# Patient Record
Sex: MALE | Race: WHITE
[De-identification: names, ages, dates, MRNs, and addresses within clinical notes are randomized per-mention and may not be internally consistent; named-entity substitution may affect disease eponyms.]

---

## 2022-06-21 PROBLEM — Z00.00 ENCOUNTER FOR PREVENTIVE HEALTH EXAMINATION: Status: ACTIVE | Noted: 2022-06-21

## 2022-07-08 ENCOUNTER — APPOINTMENT (OUTPATIENT)
Dept: ORTHOPEDIC SURGERY | Facility: CLINIC | Age: 82
End: 2022-07-08

## 2022-07-08 VITALS — BODY MASS INDEX: 22.28 KG/M2 | HEIGHT: 68 IN | WEIGHT: 147 LBS

## 2022-07-08 DIAGNOSIS — Z78.9 OTHER SPECIFIED HEALTH STATUS: ICD-10-CM

## 2022-07-08 DIAGNOSIS — E78.00 PURE HYPERCHOLESTEROLEMIA, UNSPECIFIED: ICD-10-CM

## 2022-07-08 PROCEDURE — 20550 NJX 1 TENDON SHEATH/LIGAMENT: CPT | Mod: F2

## 2022-07-08 PROCEDURE — 99214 OFFICE O/P EST MOD 30 MIN: CPT | Mod: 25

## 2022-07-08 RX ORDER — SIMVASTATIN 10 MG/1
10 TABLET, FILM COATED ORAL
Qty: 90 | Refills: 0 | Status: ACTIVE | COMMUNITY
Start: 2022-03-29

## 2022-07-08 NOTE — ASSESSMENT
[FreeTextEntry1] : Left middle finger trigger - Discussed with patient the pathoanatomy of trigger and options going forward. Risks/benefits discussed as well as natural progression that injection will provide some relief but symptoms may recur. Discussed that pain may worsen in coming days but will subside. Discussed that we can repeat an injection or that operative intervention may be indicated down the line.\par \par Procedure 1 - 0.5cc 1%lidocaine + 0.5cc 40mg/cc Kenalog administered to left middle finger A1 pulley following sterilization with Betadine. Patient tolerated this well.\par \par F/u 3 months [email or web address]

## 2022-07-08 NOTE — HISTORY OF PRESENT ILLNESS
[de-identified] : 82M, RHD, PMHX of Hyperlipidemia presents with lefte hand middle finger locking, clicking, sticking for approx 1 month. Patient reports does have pain. Denies limited ROM. Denies OT, bracing, OTC remedies.

## 2022-11-28 ENCOUNTER — OFFICE (OUTPATIENT)
Dept: URBAN - METROPOLITAN AREA CLINIC 8 | Facility: CLINIC | Age: 82
Setting detail: OPHTHALMOLOGY
End: 2022-11-28
Payer: MEDICARE

## 2022-11-28 DIAGNOSIS — H43.813: ICD-10-CM

## 2022-11-28 DIAGNOSIS — H02.831: ICD-10-CM

## 2022-11-28 DIAGNOSIS — H35.3131: ICD-10-CM

## 2022-11-28 DIAGNOSIS — Z96.1: ICD-10-CM

## 2022-11-28 DIAGNOSIS — H02.834: ICD-10-CM

## 2022-11-28 DIAGNOSIS — H26.493: ICD-10-CM

## 2022-11-28 PROCEDURE — 92134 CPTRZ OPH DX IMG PST SGM RTA: CPT | Performed by: OPHTHALMOLOGY

## 2022-11-28 PROCEDURE — 92014 COMPRE OPH EXAM EST PT 1/>: CPT | Performed by: OPHTHALMOLOGY

## 2022-11-28 ASSESSMENT — REFRACTION_CURRENTRX
OD_AXIS: 100
OS_OVR_VA: 20/
OD_SPHERE: +3.25
OS_ADD: +2.25
OD_OVR_VA: 20/
OD_VPRISM_DIRECTION: SV
OD_VPRISM_DIRECTION: SV
OS_AXIS: 090
OS_VPRISM_DIRECTION: SV
OD_CYLINDER: -1.50
OS_VPRISM_DIRECTION: SV
OS_SPHERE: +3.25
OD_ADD: +2.25
OS_OVR_VA: 20/
OD_OVR_VA: 20/
OS_CYLINDER: -1.00

## 2022-11-28 ASSESSMENT — REFRACTION_AUTOREFRACTION
OS_CYLINDER: -2.25
OD_SPHERE: -0.25
OD_AXIS: 108
OS_SPHERE: +0.25
OS_AXIS: 071
OD_CYLINDER: -0.50

## 2022-11-28 ASSESSMENT — REFRACTION_MANIFEST
OD_AXIS: 100
OD_SPHERE: +0.75
OD_ADD: +2.50
OS_CYLINDER: -0.75
OD_CYLINDER: -1.50
OD_AXIS: 110
OS_SPHERE: +0.75
OS_AXIS: 090
OS_ADD: +2.50
OS_ADD: +2.50
OD_CYLINDER: -0.50
OS_SPHERE: PLANO
OS_CYLINDER: -1.00
OD_VA1: 20/30-
OS_AXIS: 075
OS_VA2: 20/25(J1)
OS_VA1: 20/25-
OD_SPHERE: -0.50
OD_ADD: +2.50
OD_VA2: 20/25(J1)
OU_VA: 20/20-

## 2022-11-28 ASSESSMENT — SPHEQUIV_DERIVED
OS_SPHEQUIV: 0.25
OD_SPHEQUIV: -0.75
OD_SPHEQUIV: -0.5
OD_SPHEQUIV: 0
OS_SPHEQUIV: -0.875

## 2022-11-28 ASSESSMENT — KERATOMETRY
OS_K1POWER_DIOPTERS: 43.75
OD_AXISANGLE_DEGREES: 090
OS_AXISANGLE_DEGREES: 165
OD_K2POWER_DIOPTERS: 44.00
OD_K1POWER_DIOPTERS: 44.00
OS_K2POWER_DIOPTERS: 45.00
METHOD_AUTO_MANUAL: AUTO

## 2022-11-28 ASSESSMENT — VISUAL ACUITY
OD_BCVA: 20/40-2
OS_BCVA: 20/40-2

## 2022-11-28 ASSESSMENT — TONOMETRY
OS_IOP_MMHG: 12
OD_IOP_MMHG: 12

## 2022-11-28 ASSESSMENT — AXIALLENGTH_DERIVED
OD_AL: 23.701
OD_AL: 23.6031
OS_AL: 23.6114
OS_AL: 23.1804
OD_AL: 23.4097

## 2022-11-28 ASSESSMENT — LID POSITION - DERMATOCHALASIS
OS_DERMATOCHALASIS: T
OD_DERMATOCHALASIS: T

## 2022-11-28 ASSESSMENT — CONFRONTATIONAL VISUAL FIELD TEST (CVF)
OD_FINDINGS: FULL
OS_FINDINGS: FULL

## 2023-01-05 PROBLEM — H40.001 GLAUCOMA SUSPECT, LOW RISK 1-2 FACTORS; RIGHT EYE, LEFT EYE: Status: ACTIVE | Noted: 2022-12-14

## 2023-01-05 PROBLEM — H40.002 GLAUCOMA SUSPECT, LOW RISK 1-2 FACTORS; RIGHT EYE, LEFT EYE: Status: ACTIVE | Noted: 2022-12-14

## 2023-05-22 ENCOUNTER — RX ONLY (RX ONLY)
Age: 83
End: 2023-05-22

## 2023-05-22 ENCOUNTER — OFFICE (OUTPATIENT)
Dept: URBAN - METROPOLITAN AREA CLINIC 8 | Facility: CLINIC | Age: 83
Setting detail: OPHTHALMOLOGY
End: 2023-05-22
Payer: MEDICARE

## 2023-05-22 DIAGNOSIS — H02.834: ICD-10-CM

## 2023-05-22 DIAGNOSIS — H26.493: ICD-10-CM

## 2023-05-22 DIAGNOSIS — H02.831: ICD-10-CM

## 2023-05-22 DIAGNOSIS — H43.813: ICD-10-CM

## 2023-05-22 DIAGNOSIS — H35.3131: ICD-10-CM

## 2023-05-22 DIAGNOSIS — Z96.1: ICD-10-CM

## 2023-05-22 PROCEDURE — 92134 CPTRZ OPH DX IMG PST SGM RTA: CPT | Performed by: OPHTHALMOLOGY

## 2023-05-22 PROCEDURE — 99213 OFFICE O/P EST LOW 20 MIN: CPT | Performed by: OPHTHALMOLOGY

## 2023-05-22 ASSESSMENT — REFRACTION_CURRENTRX
OS_VPRISM_DIRECTION: SV
OS_OVR_VA: 20/
OD_AXIS: 100
OS_VPRISM_DIRECTION: SV
OD_SPHERE: +3.25
OD_VPRISM_DIRECTION: SV
OS_OVR_VA: 20/
OS_ADD: +2.25
OS_AXIS: 090
OD_OVR_VA: 20/
OD_ADD: +2.25
OD_OVR_VA: 20/
OD_VPRISM_DIRECTION: SV
OS_CYLINDER: -1.00
OD_CYLINDER: -1.50
OS_SPHERE: +3.25

## 2023-05-22 ASSESSMENT — LID POSITION - DERMATOCHALASIS
OS_DERMATOCHALASIS: T
OD_DERMATOCHALASIS: T

## 2023-05-22 ASSESSMENT — VISUAL ACUITY
OD_BCVA: 20/60-
OS_BCVA: 20/60

## 2023-05-22 ASSESSMENT — REFRACTION_AUTOREFRACTION
OS_CYLINDER: -2.25
OD_AXIS: 108
OD_SPHERE: PLANO
OS_SPHERE: +0.25
OS_AXIS: 078
OD_CYLINDER: -1.00

## 2023-05-22 ASSESSMENT — KERATOMETRY
OS_AXISANGLE_DEGREES: 013
OD_AXISANGLE_DEGREES: 177
METHOD_AUTO_MANUAL: AUTO
OS_K1POWER_DIOPTERS: 43.25
OS_K2POWER_DIOPTERS: 45.50
OD_K1POWER_DIOPTERS: 43.75
OD_K2POWER_DIOPTERS: 44.00

## 2023-05-22 ASSESSMENT — REFRACTION_MANIFEST
OD_AXIS: 110
OD_SPHERE: -1.00
OS_CYLINDER: -0.75
OS_AXIS: 080
OD_AXIS: 110
OD_CYLINDER: -0.25
OU_VA: 20/20
OD_ADD: +2.75
OD_ADD: +3.00
OS_VA2: 20/20(J1+)
OS_CYLINDER: -0.75
OD_VA2: 20/20(J1+)
OS_VA1: 20/25+3
OS_ADD: +2.75
OS_AXIS: 080
OS_ADD: +3.00
OS_SPHERE: PLANO
OS_VA1: 20/25+3
OS_VA2: 20/20(J1+)
OS_SPHERE: -0.50
OD_VA1: 20/40
OD_VA2: 20/20(J1+)
OU_VA: 20/20
OD_SPHERE: -0.75
OD_VA1: 20/40
OD_CYLINDER: -0.25

## 2023-05-22 ASSESSMENT — SPHEQUIV_DERIVED
OS_SPHEQUIV: -0.875
OD_SPHEQUIV: -0.875
OD_SPHEQUIV: -1.125
OS_SPHEQUIV: -0.875

## 2023-05-22 ASSESSMENT — AXIALLENGTH_DERIVED
OD_AL: 23.7968
OD_AL: 23.8963
OS_AL: 23.6114
OS_AL: 23.6114

## 2023-05-22 ASSESSMENT — CONFRONTATIONAL VISUAL FIELD TEST (CVF)
OD_FINDINGS: FULL
OS_FINDINGS: FULL

## 2023-06-30 ENCOUNTER — APPOINTMENT (OUTPATIENT)
Dept: ORTHOPEDIC SURGERY | Facility: CLINIC | Age: 83
End: 2023-06-30
Payer: MEDICARE

## 2023-06-30 VITALS — HEIGHT: 68 IN | WEIGHT: 147 LBS | BODY MASS INDEX: 22.28 KG/M2

## 2023-06-30 DIAGNOSIS — M65.30 TRIGGER FINGER, UNSPECIFIED FINGER: ICD-10-CM

## 2023-06-30 PROCEDURE — 99214 OFFICE O/P EST MOD 30 MIN: CPT | Mod: 25

## 2023-06-30 PROCEDURE — 20550 NJX 1 TENDON SHEATH/LIGAMENT: CPT | Mod: LT

## 2023-06-30 RX ORDER — ASPIRIN 81 MG/1
81 TABLET ORAL
Refills: 0 | Status: ACTIVE | COMMUNITY

## 2023-06-30 NOTE — ASSESSMENT
[FreeTextEntry1] : Left middle finger trigger - Discussed with patient the pathoanatomy of trigger and options going forward. Risks/benefits discussed as well as natural progression that injection will provide some relief but symptoms may recur. Discussed that pain may worsen in coming days but will subside. Discussed that we can repeat an injection or that operative intervention may be indicated down the line.\par \par Procedure 1 - 0.5cc 1%lidocaine + 0.5cc 40mg/cc Kenalog administered to left middle finger A1 pulley following sterilization with Betadine. Patient tolerated this well.\par \par F/u 3 months ([email or web address])

## 2023-06-30 NOTE — IMAGING
[de-identified] : Left hand with no swelling or erythema. Able to make fist, oppose thumb to small finger with good thenar bulk. No intrinsic atrophy. Tender along distal palmar crease, most notable at middle finger. Overt locking with catching palpable. Sensation intact throughout with <2sec cap refill.

## 2023-06-30 NOTE — HISTORY OF PRESENT ILLNESS
[de-identified] : 82M, RHD, PMHX of Hyperlipidemia presents with lefte hand middle finger locking, clicking, sticking for approx 1 month. Patient reports does have pain. Denies limited ROM. Denies OT, bracing, OTC remedies.\par \par 6/30/23: follow up left middle finger. States he felt relief from his trigger finger injection up until ~2 months ago. States his finger is locking up. States he feels pain with exercise and sleep. Denies taking pain medication.

## 2023-11-20 ENCOUNTER — OFFICE (OUTPATIENT)
Dept: URBAN - METROPOLITAN AREA CLINIC 8 | Facility: CLINIC | Age: 83
Setting detail: OPHTHALMOLOGY
End: 2023-11-20
Payer: MEDICARE

## 2023-11-20 DIAGNOSIS — H02.834: ICD-10-CM

## 2023-11-20 DIAGNOSIS — H26.493: ICD-10-CM

## 2023-11-20 DIAGNOSIS — H43.813: ICD-10-CM

## 2023-11-20 DIAGNOSIS — H02.831: ICD-10-CM

## 2023-11-20 DIAGNOSIS — H35.3131: ICD-10-CM

## 2023-11-20 DIAGNOSIS — Z96.1: ICD-10-CM

## 2023-11-20 PROCEDURE — 92014 COMPRE OPH EXAM EST PT 1/>: CPT | Performed by: OPHTHALMOLOGY

## 2023-11-20 PROCEDURE — 92134 CPTRZ OPH DX IMG PST SGM RTA: CPT | Performed by: OPHTHALMOLOGY

## 2023-11-20 ASSESSMENT — REFRACTION_CURRENTRX
OD_OVR_VA: 20/
OD_ADD: +2.25
OD_VPRISM_DIRECTION: SV
OS_ADD: +2.25
OS_SPHERE: +3.25
OS_OVR_VA: 20/
OD_SPHERE: +3.25
OS_AXIS: 090
OD_OVR_VA: 20/
OD_CYLINDER: -1.50
OS_OVR_VA: 20/
OS_VPRISM_DIRECTION: SV
OD_AXIS: 100
OD_VPRISM_DIRECTION: SV
OS_VPRISM_DIRECTION: SV
OS_CYLINDER: -1.00

## 2023-11-20 ASSESSMENT — LID POSITION - DERMATOCHALASIS
OD_DERMATOCHALASIS: T
OS_DERMATOCHALASIS: T

## 2023-11-20 ASSESSMENT — REFRACTION_MANIFEST
OD_VA1: 20/40
OS_VA1: 20/25+3
OS_SPHERE: -0.50
OD_ADD: +3.00
OS_VA2: 20/20(J1+)
OD_VA1: 20/40
OS_AXIS: 080
OD_CYLINDER: -0.25
OU_VA: 20/25
OD_SPHERE: -1.00
OU_VA: 20/20
OD_CYLINDER: -0.25
OD_VA2: 20/20(J1+)
OD_ADD: +2.75
OD_AXIS: 110
OS_SPHERE: -0.50
OD_VA2: 20/20(J1+)
OS_ADD: +2.75
OS_VA2: 20/20(J1+)
OS_VA1: 20/30-
OS_CYLINDER: -1.25
OS_AXIS: 080
OD_SPHERE: -0.50
OD_AXIS: 090
OS_ADD: +3.00
OS_CYLINDER: -0.75

## 2023-11-20 ASSESSMENT — SPHEQUIV_DERIVED
OS_SPHEQUIV: -0.875
OS_SPHEQUIV: -0.625
OS_SPHEQUIV: -1.125
OD_SPHEQUIV: -1.125
OD_SPHEQUIV: -0.75
OD_SPHEQUIV: -0.625

## 2023-11-20 ASSESSMENT — CONFRONTATIONAL VISUAL FIELD TEST (CVF)
OS_FINDINGS: FULL
OD_FINDINGS: FULL

## 2023-11-20 ASSESSMENT — REFRACTION_AUTOREFRACTION
OD_SPHERE: -0.25
OD_AXIS: 091
OD_CYLINDER: -1.00
OS_CYLINDER: -1.75
OS_AXIS: 081
OS_SPHERE: +0.25

## 2024-01-08 PROBLEM — H40.002 GLAUCOMA SUSPECT, LOW RISK 1-2 FACTORS; RIGHT EYE, LEFT EYE: Status: ACTIVE | Noted: 2024-01-08

## 2024-01-08 PROBLEM — H40.001 GLAUCOMA SUSPECT, LOW RISK 1-2 FACTORS; RIGHT EYE, LEFT EYE: Status: ACTIVE | Noted: 2024-01-08

## 2024-05-22 ENCOUNTER — OFFICE (OUTPATIENT)
Dept: URBAN - METROPOLITAN AREA CLINIC 8 | Facility: CLINIC | Age: 84
Setting detail: OPHTHALMOLOGY
End: 2024-05-22
Payer: MEDICARE

## 2024-05-22 VITALS — HEIGHT: 60 IN

## 2024-05-22 DIAGNOSIS — H02.831: ICD-10-CM

## 2024-05-22 DIAGNOSIS — H35.3131: ICD-10-CM

## 2024-05-22 DIAGNOSIS — H26.493: ICD-10-CM

## 2024-05-22 DIAGNOSIS — H02.834: ICD-10-CM

## 2024-05-22 DIAGNOSIS — Z96.1: ICD-10-CM

## 2024-05-22 PROBLEM — H43.813 POSTERIOR VITREOUS DETACHMENT ; BOTH EYES: Status: ACTIVE | Noted: 2024-05-22

## 2024-05-22 PROCEDURE — 99213 OFFICE O/P EST LOW 20 MIN: CPT | Performed by: OPHTHALMOLOGY

## 2024-05-22 PROCEDURE — 92134 CPTRZ OPH DX IMG PST SGM RTA: CPT | Performed by: OPHTHALMOLOGY

## 2024-05-22 ASSESSMENT — LID POSITION - DERMATOCHALASIS
OD_DERMATOCHALASIS: T
OS_DERMATOCHALASIS: T

## 2024-05-22 ASSESSMENT — CONFRONTATIONAL VISUAL FIELD TEST (CVF)
OS_FINDINGS: FULL
OD_FINDINGS: FULL

## 2024-09-25 PROBLEM — H40.002 GLAUCOMA SUSPECT, LOW RISK 1-2 FACTORS; RIGHT EYE, LEFT EYE: Status: ACTIVE | Noted: 2024-09-25

## 2024-09-25 PROBLEM — H40.001 GLAUCOMA SUSPECT, LOW RISK 1-2 FACTORS; RIGHT EYE, LEFT EYE: Status: ACTIVE | Noted: 2024-09-25

## 2025-01-07 ENCOUNTER — OFFICE (OUTPATIENT)
Dept: URBAN - METROPOLITAN AREA CLINIC 8 | Facility: CLINIC | Age: 85
Setting detail: OPHTHALMOLOGY
End: 2025-01-07
Payer: MEDICARE

## 2025-01-07 VITALS — HEIGHT: 60 IN

## 2025-01-07 DIAGNOSIS — H43.813: ICD-10-CM

## 2025-01-07 DIAGNOSIS — H26.493: ICD-10-CM

## 2025-01-07 DIAGNOSIS — H35.3131: ICD-10-CM

## 2025-01-07 DIAGNOSIS — H02.831: ICD-10-CM

## 2025-01-07 PROCEDURE — 92134 CPTRZ OPH DX IMG PST SGM RTA: CPT

## 2025-01-07 PROCEDURE — 92014 COMPRE OPH EXAM EST PT 1/>: CPT

## 2025-01-07 ASSESSMENT — REFRACTION_MANIFEST
OS_ADD: +2.50
OS_SPHERE: PLANO
OD_VA2: 20/20(J1+)
OD_ADD: +2.75
OD_VA2: 20/20(J1+)
OS_CYLINDER: -0.75
OS_SPHERE: -0.50
OS_VA1: 20/25+3
OS_VA2: 20/20(J1+)
OD_SPHERE: -0.25
OD_CYLINDER: -0.25
OD_VA1: 20/40
OD_CYLINDER: -0.75
OU_VA: 20/20
OS_AXIS: 075
OS_AXIS: 080
OS_VA1: 20/20-1
OD_SPHERE: -1.00
OS_ADD: +2.75
OS_CYLINDER: -1.50
OD_VA1: 20/25-3
OS_VA2: 20/20(J1+)
OD_ADD: +2.50
OD_AXIS: 100
OD_AXIS: 110
OU_VA: 20/20

## 2025-01-07 ASSESSMENT — VISUAL ACUITY
OD_BCVA: 20/50-1
OS_BCVA: 20/25-2

## 2025-01-07 ASSESSMENT — REFRACTION_CURRENTRX
OD_VPRISM_DIRECTION: SV
OS_VPRISM_DIRECTION: SV
OD_VPRISM_DIRECTION: SV
OD_CYLINDER: +0.25
OD_OVR_VA: 20/
OD_SPHERE: +-1.00
OS_AXIS: 074
OD_AXIS: 108
OS_CYLINDER: -0.75
OD_OVR_VA: 20/
OS_ADD: +2.25
OS_SPHERE: -0.50
OS_OVR_VA: 20/
OD_ADD: +2.25
OS_OVR_VA: 20/
OS_VPRISM_DIRECTION: SV

## 2025-01-07 ASSESSMENT — REFRACTION_AUTOREFRACTION
OD_AXIS: 097
OD_CYLINDER: -1.00
OS_AXIS: 079
OS_SPHERE: +0.25
OD_SPHERE: +0.25
OS_CYLINDER: -2.00

## 2025-01-07 ASSESSMENT — KERATOMETRY
OS_K1POWER_DIOPTERS: 43.50
METHOD_AUTO_MANUAL: AUTO
OD_K2POWER_DIOPTERS: 44.00
OS_AXISANGLE_DEGREES: 172
OD_AXISANGLE_DEGREES: 012
OS_K2POWER_DIOPTERS: 44.75
OD_K1POWER_DIOPTERS: 43.75

## 2025-01-07 ASSESSMENT — LID POSITION - DERMATOCHALASIS
OD_DERMATOCHALASIS: T
OS_DERMATOCHALASIS: T

## 2025-01-07 ASSESSMENT — CONFRONTATIONAL VISUAL FIELD TEST (CVF)
OS_FINDINGS: FULL
OD_FINDINGS: FULL

## 2025-01-07 ASSESSMENT — TONOMETRY
OS_IOP_MMHG: 13
OD_IOP_MMHG: 12